# Patient Record
Sex: FEMALE | Race: WHITE
[De-identification: names, ages, dates, MRNs, and addresses within clinical notes are randomized per-mention and may not be internally consistent; named-entity substitution may affect disease eponyms.]

---

## 2017-07-11 ENCOUNTER — NON-APPOINTMENT (OUTPATIENT)
Age: 57
End: 2017-07-11

## 2017-07-11 ENCOUNTER — APPOINTMENT (OUTPATIENT)
Dept: INTERNAL MEDICINE | Facility: CLINIC | Age: 57
End: 2017-07-11

## 2017-07-11 VITALS
SYSTOLIC BLOOD PRESSURE: 102 MMHG | BODY MASS INDEX: 23.54 KG/M2 | RESPIRATION RATE: 16 BRPM | HEIGHT: 67 IN | TEMPERATURE: 98 F | HEART RATE: 74 BPM | DIASTOLIC BLOOD PRESSURE: 58 MMHG | WEIGHT: 150 LBS | OXYGEN SATURATION: 97 %

## 2017-07-11 RX ORDER — POLYETHYLENE GLYCOL 3350 17 G/17G
17 POWDER, FOR SOLUTION ORAL
Qty: 527 | Refills: 0 | Status: ACTIVE | COMMUNITY
Start: 2017-05-29

## 2017-07-11 RX ORDER — OMEPRAZOLE 40 MG/1
40 CAPSULE, DELAYED RELEASE ORAL
Qty: 30 | Refills: 0 | Status: ACTIVE | COMMUNITY
Start: 2017-03-27

## 2017-07-11 RX ORDER — LEVOTHYROXINE SODIUM 0.14 MG/1
137 TABLET ORAL
Qty: 90 | Refills: 0 | Status: ACTIVE | COMMUNITY
Start: 2017-07-01

## 2018-04-18 ENCOUNTER — INPATIENT (INPATIENT)
Facility: HOSPITAL | Age: 58
LOS: 1 days | Discharge: HOME | End: 2018-04-20
Attending: HOSPITALIST | Admitting: HOSPITALIST
Payer: COMMERCIAL

## 2018-04-18 VITALS
WEIGHT: 154.98 LBS | HEART RATE: 61 BPM | OXYGEN SATURATION: 99 % | RESPIRATION RATE: 17 BRPM | DIASTOLIC BLOOD PRESSURE: 101 MMHG | SYSTOLIC BLOOD PRESSURE: 170 MMHG | TEMPERATURE: 98 F

## 2018-04-18 LAB
ALBUMIN SERPL ELPH-MCNC: 3.8 G/DL — SIGNIFICANT CHANGE UP (ref 3.3–5)
ALP SERPL-CCNC: 68 U/L — SIGNIFICANT CHANGE UP (ref 40–120)
ALT FLD-CCNC: 20 U/L — SIGNIFICANT CHANGE UP (ref 12–78)
ANION GAP SERPL CALC-SCNC: 8 MMOL/L — SIGNIFICANT CHANGE UP (ref 5–17)
APPEARANCE UR: CLEAR — SIGNIFICANT CHANGE UP
APTT BLD: 31.6 SEC — SIGNIFICANT CHANGE UP (ref 27.5–37.4)
AST SERPL-CCNC: 12 U/L — LOW (ref 15–37)
BACTERIA # UR AUTO: (no result)
BASOPHILS # BLD AUTO: 0.03 K/UL — SIGNIFICANT CHANGE UP (ref 0–0.2)
BASOPHILS NFR BLD AUTO: 0.6 % — SIGNIFICANT CHANGE UP (ref 0–2)
BILIRUB SERPL-MCNC: 0.8 MG/DL — SIGNIFICANT CHANGE UP (ref 0.2–1.2)
BILIRUB UR-MCNC: NEGATIVE — SIGNIFICANT CHANGE UP
BLD GP AB SCN SERPL QL: SIGNIFICANT CHANGE UP
BUN SERPL-MCNC: 18 MG/DL — SIGNIFICANT CHANGE UP (ref 7–23)
CALCIUM SERPL-MCNC: 9 MG/DL — SIGNIFICANT CHANGE UP (ref 8.5–10.1)
CHLORIDE SERPL-SCNC: 103 MMOL/L — SIGNIFICANT CHANGE UP (ref 96–108)
CK SERPL-CCNC: 34 U/L — SIGNIFICANT CHANGE UP (ref 26–192)
CO2 SERPL-SCNC: 27 MMOL/L — SIGNIFICANT CHANGE UP (ref 22–31)
COLOR SPEC: YELLOW — SIGNIFICANT CHANGE UP
CREAT SERPL-MCNC: 0.68 MG/DL — SIGNIFICANT CHANGE UP (ref 0.5–1.3)
DIFF PNL FLD: NEGATIVE — SIGNIFICANT CHANGE UP
EOSINOPHIL # BLD AUTO: 0.08 K/UL — SIGNIFICANT CHANGE UP (ref 0–0.5)
EOSINOPHIL NFR BLD AUTO: 1.5 % — SIGNIFICANT CHANGE UP (ref 0–6)
EPI CELLS # UR: SIGNIFICANT CHANGE UP
GLUCOSE SERPL-MCNC: 98 MG/DL — SIGNIFICANT CHANGE UP (ref 70–99)
GLUCOSE UR QL: NEGATIVE MG/DL — SIGNIFICANT CHANGE UP
HCT VFR BLD CALC: 39.5 % — SIGNIFICANT CHANGE UP (ref 34.5–45)
HGB BLD-MCNC: 13.2 G/DL — SIGNIFICANT CHANGE UP (ref 11.5–15.5)
IMM GRANULOCYTES NFR BLD AUTO: 0.2 % — SIGNIFICANT CHANGE UP (ref 0–1.5)
INR BLD: 0.94 RATIO — SIGNIFICANT CHANGE UP (ref 0.88–1.16)
KETONES UR-MCNC: NEGATIVE — SIGNIFICANT CHANGE UP
LEUKOCYTE ESTERASE UR-ACNC: (no result)
LYMPHOCYTES # BLD AUTO: 1.53 K/UL — SIGNIFICANT CHANGE UP (ref 1–3.3)
LYMPHOCYTES # BLD AUTO: 28.2 % — SIGNIFICANT CHANGE UP (ref 13–44)
MCHC RBC-ENTMCNC: 32.2 PG — SIGNIFICANT CHANGE UP (ref 27–34)
MCHC RBC-ENTMCNC: 33.4 GM/DL — SIGNIFICANT CHANGE UP (ref 32–36)
MCV RBC AUTO: 96.3 FL — SIGNIFICANT CHANGE UP (ref 80–100)
MONOCYTES # BLD AUTO: 0.44 K/UL — SIGNIFICANT CHANGE UP (ref 0–0.9)
MONOCYTES NFR BLD AUTO: 8.1 % — SIGNIFICANT CHANGE UP (ref 2–14)
NEUTROPHILS # BLD AUTO: 3.33 K/UL — SIGNIFICANT CHANGE UP (ref 1.8–7.4)
NEUTROPHILS NFR BLD AUTO: 61.4 % — SIGNIFICANT CHANGE UP (ref 43–77)
NITRITE UR-MCNC: NEGATIVE — SIGNIFICANT CHANGE UP
NRBC # BLD: 0 /100 WBCS — SIGNIFICANT CHANGE UP (ref 0–0)
PH UR: 7 — SIGNIFICANT CHANGE UP (ref 5–8)
PLATELET # BLD AUTO: 233 K/UL — SIGNIFICANT CHANGE UP (ref 150–400)
POTASSIUM SERPL-MCNC: 4.2 MMOL/L — SIGNIFICANT CHANGE UP (ref 3.5–5.3)
POTASSIUM SERPL-SCNC: 4.2 MMOL/L — SIGNIFICANT CHANGE UP (ref 3.5–5.3)
PROT SERPL-MCNC: 7.5 GM/DL — SIGNIFICANT CHANGE UP (ref 6–8.3)
PROT UR-MCNC: NEGATIVE MG/DL — SIGNIFICANT CHANGE UP
PROTHROM AB SERPL-ACNC: 10.1 SEC — SIGNIFICANT CHANGE UP (ref 9.8–12.7)
RBC # BLD: 4.1 M/UL — SIGNIFICANT CHANGE UP (ref 3.8–5.2)
RBC # FLD: 11.9 % — SIGNIFICANT CHANGE UP (ref 10.3–14.5)
RBC CASTS # UR COMP ASSIST: NEGATIVE /HPF — SIGNIFICANT CHANGE UP (ref 0–4)
SODIUM SERPL-SCNC: 138 MMOL/L — SIGNIFICANT CHANGE UP (ref 135–145)
SP GR SPEC: 1 — LOW (ref 1.01–1.02)
TROPONIN I SERPL-MCNC: <0.015 NG/ML — SIGNIFICANT CHANGE UP (ref 0.01–0.04)
TYPE + AB SCN PNL BLD: SIGNIFICANT CHANGE UP
UROBILINOGEN FLD QL: NEGATIVE MG/DL — SIGNIFICANT CHANGE UP
WBC # BLD: 5.42 K/UL — SIGNIFICANT CHANGE UP (ref 3.8–10.5)
WBC # FLD AUTO: 5.42 K/UL — SIGNIFICANT CHANGE UP (ref 3.8–10.5)
WBC UR QL: SIGNIFICANT CHANGE UP

## 2018-04-18 PROCEDURE — 70450 CT HEAD/BRAIN W/O DYE: CPT | Mod: 26

## 2018-04-18 PROCEDURE — 93010 ELECTROCARDIOGRAM REPORT: CPT

## 2018-04-18 PROCEDURE — 71045 X-RAY EXAM CHEST 1 VIEW: CPT | Mod: 26

## 2018-04-18 PROCEDURE — 99285 EMERGENCY DEPT VISIT HI MDM: CPT

## 2018-04-18 RX ORDER — ASPIRIN/CALCIUM CARB/MAGNESIUM 324 MG
81 TABLET ORAL DAILY
Qty: 0 | Refills: 0 | Status: DISCONTINUED | OUTPATIENT
Start: 2018-04-18 | End: 2018-04-20

## 2018-04-18 RX ORDER — NEBIVOLOL HYDROCHLORIDE 5 MG/1
5 TABLET ORAL DAILY
Qty: 0 | Refills: 0 | Status: DISCONTINUED | OUTPATIENT
Start: 2018-04-18 | End: 2018-04-20

## 2018-04-18 RX ORDER — POLYETHYLENE GLYCOL 3350 17 G/17G
17 POWDER, FOR SOLUTION ORAL DAILY
Qty: 0 | Refills: 0 | Status: DISCONTINUED | OUTPATIENT
Start: 2018-04-18 | End: 2018-04-20

## 2018-04-18 RX ORDER — HEPARIN SODIUM 5000 [USP'U]/ML
5000 INJECTION INTRAVENOUS; SUBCUTANEOUS EVERY 8 HOURS
Qty: 0 | Refills: 0 | Status: DISCONTINUED | OUTPATIENT
Start: 2018-04-18 | End: 2018-04-20

## 2018-04-18 RX ORDER — LEVOTHYROXINE SODIUM 125 MCG
125 TABLET ORAL DAILY
Qty: 0 | Refills: 0 | Status: DISCONTINUED | OUTPATIENT
Start: 2018-04-18 | End: 2018-04-20

## 2018-04-18 RX ORDER — MAGNESIUM OXIDE 400 MG ORAL TABLET 241.3 MG
200 TABLET ORAL DAILY
Qty: 0 | Refills: 0 | Status: DISCONTINUED | OUTPATIENT
Start: 2018-04-18 | End: 2018-04-20

## 2018-04-18 RX ORDER — POLYETHYLENE GLYCOL 3350 17 G/17G
0 POWDER, FOR SOLUTION ORAL
Qty: 0 | Refills: 0 | COMMUNITY

## 2018-04-18 RX ORDER — CHOLECALCIFEROL (VITAMIN D3) 125 MCG
1000 CAPSULE ORAL DAILY
Qty: 0 | Refills: 0 | Status: DISCONTINUED | OUTPATIENT
Start: 2018-04-18 | End: 2018-04-20

## 2018-04-18 RX ORDER — DOCUSATE SODIUM 100 MG
100 CAPSULE ORAL THREE TIMES A DAY
Qty: 0 | Refills: 0 | Status: DISCONTINUED | OUTPATIENT
Start: 2018-04-18 | End: 2018-04-20

## 2018-04-18 RX ORDER — ASPIRIN/CALCIUM CARB/MAGNESIUM 324 MG
325 TABLET ORAL ONCE
Qty: 0 | Refills: 0 | Status: COMPLETED | OUTPATIENT
Start: 2018-04-18 | End: 2018-04-18

## 2018-04-18 RX ORDER — SENNA PLUS 8.6 MG/1
2 TABLET ORAL AT BEDTIME
Qty: 0 | Refills: 0 | Status: DISCONTINUED | OUTPATIENT
Start: 2018-04-18 | End: 2018-04-20

## 2018-04-18 RX ADMIN — HEPARIN SODIUM 5000 UNIT(S): 5000 INJECTION INTRAVENOUS; SUBCUTANEOUS at 23:53

## 2018-04-18 RX ADMIN — Medication 325 MILLIGRAM(S): at 19:17

## 2018-04-18 NOTE — H&P ADULT - ASSESSMENT
58 y/o F PMHx significant for HTN, MARIA E, and hypothyroidism presents to the ED for further evaluation of transient   right eye blindness which began while driving home @ 1530 (day of admission) associated with left arm and tongue   "numbness." The patient's symptoms lasted approximately 30 minutes and improved without intervention. The patient went to her Cardiologist's office who advised/referred to the ED t/o assess for possible TIA/CVA. Upon arrival @(1745) code stroke was initiated. Per ED NIHSS was (0) on admission. Neurology was consulted in the ED.    1)TIA  ~admit to Telemetry  ~f/u w/ Neurology in the am  ~f/u MRI Brain  ~neuro checks  ~per ED pt passed dysphasia screen  ~cont. ASA 81mg po daily (given 325mg PO x 1 in the ED)  ~f/u Lipid profile    2)HTN  ~cont. Bystolic 5mg po daily    3)Hypothyroidism  ~cont. Levothyroxine 125mcg po daily    4)Vte ppx  ~cont. Heparin sq

## 2018-04-18 NOTE — ED ADULT TRIAGE NOTE - CHIEF COMPLAINT QUOTE
pt c/op right eye momentary blindness at 1530 and then driving home she had left arm and tongue mi,bness and tingling that lasted a half hour, pt states since then the symtpoms subsided,  pt states she then went to pulmonologist who did EKG that was normal but advised pt to go to ED for r/o TIA vs/ CVA. pt states she ahs no weakness or cognitive changes, neuros itnact at this time, Dr. bojorquez at [facility] to evaluate pt - code stroke called at 1745, no hx of tia cva or mi

## 2018-04-18 NOTE — ED ADULT NURSE REASSESSMENT NOTE - NS ED NURSE REASSESS COMMENT FT1
Dr Ching made aware of elevated bp. Pt is axox4, with no deficits, no numbness, no facial drooping or slurring of speech. Awaiting ct-scan. Will continue to monitor.

## 2018-04-18 NOTE — H&P ADULT - NSHPPHYSICALEXAM_GEN_ALL_CORE
Vital Signs Last 24 Hrs  T(C): 36.7 (18 Apr 2018 19:18), Max: 36.7 (18 Apr 2018 17:40)  T(F): 98 (18 Apr 2018 19:18), Max: 98.1 (18 Apr 2018 17:40)  HR: 81 (18 Apr 2018 19:18) (61 - 82)  BP: 155/84 (18 Apr 2018 19:18) (155/84 - 174/82)  RR: 17 (18 Apr 2018 19:18) (15 - 17)  SpO2: 100% (18 Apr 2018 19:18) (99% - 100%)

## 2018-04-18 NOTE — ED PROVIDER NOTE - PROGRESS NOTE DETAILS
Jeane DOBBS: Case discussed with Dr. Ledesma on call for neurology; recommends admission to the hospital for further work up at this time; full strength aspirin given; patient in agreement in plan; will endorse to hospitalist.

## 2018-04-18 NOTE — ED ADULT NURSE NOTE - CHIEF COMPLAINT QUOTE
pt c/op right eye momentary blindness at 1530 and then driving home she had left arm and tongue mi,bness and tingling that lasted a half hour, pt states since then the symtpoms subsided,  pt states she then went to pulmonologist who did EKG that was normal but advised pt to go to ED for r/o TIA vs/ CVA. pt states she ahs no weakness or cognitive changes, neuros itnact at this time, Dr. bojorquez at [facility] to evaluate pt - code stroke called at 1745, no hx of tia cva or mi

## 2018-04-18 NOTE — ED ADULT NURSE NOTE - OBJECTIVE STATEMENT
Pt was driving and temporary became  blind in right eye and had numbness and tingling in right hand, which subsided

## 2018-04-18 NOTE — ED ADULT NURSE REASSESSMENT NOTE - NS ED NURSE REASSESS COMMENT FT1
Pt received in stretcher. Handoff given by ENDER Herrera. A&Ox3. MAEx4. No neurological deficit noted. Denies numbness or tingling at this time. Breathing spontaneously on RA. No SOB or cough. VS as documented. Medicated as ordered and tolerated well. All needs are met and safety maintained. Will continue to monitor.

## 2018-04-18 NOTE — ED PROVIDER NOTE - OBJECTIVE STATEMENT
57 y-o female presents to the ED s/p 30 minutes of numbness to left arm and left tongue, self resolved. Prior to Sx onset, Pt noted visual changes after looking into bright light. [facility]. Pt Went to Dr Garnett office [facility] and was seen by NP. Pt was told to come to ED for CT, normal EKG. No blood thinners , hx of strokes, numbness of upper extremities, slurred speech or unsteady gate.  Visual change resolved after a few seconds. Pt currently feels back to baseline. Stroke protocol called by ED attending Dr. Luis, during intake evaluation. 57 y-o female presents to the ED s/p 30 minutes of numbness to left arm and left tongue, self resolved. Prior to Sx onset, Pt noted visual changes after looking into bright light. Symptoms onset [facility] issue. Pt Went to Dr Garnett office [facility] and was seen by NP. Pt was told to come to ED for CT, normal EKG at [facility] office. No blood thinners , hx of strokes, numbness of upper extremities, slurred speech or unsteady gate.  Visual change resolved after a few seconds. Pt currently feels back to baseline. Stroke protocol called by ED attending Dr. Luis, during intake evaluation.

## 2018-04-18 NOTE — ED PROVIDER NOTE - MEDICAL DECISION MAKING DETAILS
57 y-o presents with left sided parosteitis, no focal neuro deficits. Will give Code Stroke workup. Ct head, EKG, CXR, Labs, UA, consult Neuro. 57 y-o presents with ?TIA, no focal neuro deficits at this time. Code stroke activated; EKG/CXR, labs, CT scan, UA; neuro consult; re-eval

## 2018-04-18 NOTE — H&P ADULT - HISTORY OF PRESENT ILLNESS
58 y/o F PMHx significant for HTN, MARIA E, and hypothyroidism presents to the ED for further evaluation of transient   right eye blindness which began while driving home @ 1530 (day of admission) associated with left arm and tongue   "numbness." The patient's symptoms lasted approximately 30 minutes and improved without intervention. The patient went to her Cardiologist's office who advised/referred to the ED t/o assess for possible TIA/CVA. Upon arrival @(6605) code stroke was initiated. Per ED NIHSS was (0) on admission. Neurology was consulted in the ED.

## 2018-04-18 NOTE — ED STATDOCS - PROGRESS NOTE DETAILS
Yajaira LEDEZMA for ED attending, Dr. Luis: 58 Y/O  female ambulatory to ED s/p 30 minutes of numbness left arm and left tongue, self resolved. Before that noted visual change after looking into bright light. Visual change resolved after a few seconds. Pt currently feels back to baseline, will institute stroke protocol, CT head, labs, send to [facility] for further evaluation. Will send pt to the [facility] ED for further evaluation. Yajaira LEDEZMA for ED attending, Dr. Luis: 56 Y/O  female ambulatory to ED s/p 30 minutes of numbness left arm and left tongue, self resolved. Before that noted visual change after looking into bright light. Symptoms onset ~ 3:30 PM.  Visual change resolved after a few seconds. Pt currently feels back to baseline, will institute stroke protocol, CT head, labs, send to [facility] for further evaluation. Will send pt to the [facility] ED for further evaluation.

## 2018-04-19 ENCOUNTER — TRANSCRIPTION ENCOUNTER (OUTPATIENT)
Age: 58
End: 2018-04-19

## 2018-04-19 LAB
ALBUMIN SERPL ELPH-MCNC: 3.3 G/DL — SIGNIFICANT CHANGE UP (ref 3.3–5)
ALP SERPL-CCNC: 62 U/L — SIGNIFICANT CHANGE UP (ref 40–120)
ALT FLD-CCNC: 17 U/L — SIGNIFICANT CHANGE UP (ref 12–78)
ANION GAP SERPL CALC-SCNC: 8 MMOL/L — SIGNIFICANT CHANGE UP (ref 5–17)
AST SERPL-CCNC: 11 U/L — LOW (ref 15–37)
BASOPHILS # BLD AUTO: 0.02 K/UL — SIGNIFICANT CHANGE UP (ref 0–0.2)
BASOPHILS NFR BLD AUTO: 0.4 % — SIGNIFICANT CHANGE UP (ref 0–2)
BILIRUB SERPL-MCNC: 1.3 MG/DL — HIGH (ref 0.2–1.2)
BUN SERPL-MCNC: 22 MG/DL — SIGNIFICANT CHANGE UP (ref 7–23)
CALCIUM SERPL-MCNC: 8.7 MG/DL — SIGNIFICANT CHANGE UP (ref 8.5–10.1)
CHLORIDE SERPL-SCNC: 106 MMOL/L — SIGNIFICANT CHANGE UP (ref 96–108)
CHOLEST SERPL-MCNC: 186 MG/DL — SIGNIFICANT CHANGE UP (ref 10–199)
CO2 SERPL-SCNC: 26 MMOL/L — SIGNIFICANT CHANGE UP (ref 22–31)
CREAT SERPL-MCNC: 0.61 MG/DL — SIGNIFICANT CHANGE UP (ref 0.5–1.3)
CULTURE RESULTS: SIGNIFICANT CHANGE UP
EOSINOPHIL # BLD AUTO: 0.11 K/UL — SIGNIFICANT CHANGE UP (ref 0–0.5)
EOSINOPHIL NFR BLD AUTO: 2.3 % — SIGNIFICANT CHANGE UP (ref 0–6)
ESTIMATED AVERAGE GLUCOSE: 105 MG/DL — SIGNIFICANT CHANGE UP (ref 68–114)
GLUCOSE SERPL-MCNC: 89 MG/DL — SIGNIFICANT CHANGE UP (ref 70–99)
HBA1C BLD-MCNC: 5.3 % — SIGNIFICANT CHANGE UP (ref 4–5.6)
HBA1C BLD-MCNC: 5.3 % — SIGNIFICANT CHANGE UP (ref 4–5.6)
HCT VFR BLD CALC: 39.3 % — SIGNIFICANT CHANGE UP (ref 34.5–45)
HDLC SERPL-MCNC: 74 MG/DL — SIGNIFICANT CHANGE UP (ref 40–125)
HGB BLD-MCNC: 12.9 G/DL — SIGNIFICANT CHANGE UP (ref 11.5–15.5)
IMM GRANULOCYTES NFR BLD AUTO: 0.4 % — SIGNIFICANT CHANGE UP (ref 0–1.5)
LIPID PNL WITH DIRECT LDL SERPL: 105 MG/DL — SIGNIFICANT CHANGE UP
LYMPHOCYTES # BLD AUTO: 1.29 K/UL — SIGNIFICANT CHANGE UP (ref 1–3.3)
LYMPHOCYTES # BLD AUTO: 27.3 % — SIGNIFICANT CHANGE UP (ref 13–44)
MCHC RBC-ENTMCNC: 31.7 PG — SIGNIFICANT CHANGE UP (ref 27–34)
MCHC RBC-ENTMCNC: 32.8 GM/DL — SIGNIFICANT CHANGE UP (ref 32–36)
MCV RBC AUTO: 96.6 FL — SIGNIFICANT CHANGE UP (ref 80–100)
MONOCYTES # BLD AUTO: 0.39 K/UL — SIGNIFICANT CHANGE UP (ref 0–0.9)
MONOCYTES NFR BLD AUTO: 8.3 % — SIGNIFICANT CHANGE UP (ref 2–14)
NEUTROPHILS # BLD AUTO: 2.89 K/UL — SIGNIFICANT CHANGE UP (ref 1.8–7.4)
NEUTROPHILS NFR BLD AUTO: 61.3 % — SIGNIFICANT CHANGE UP (ref 43–77)
NRBC # BLD: 0 /100 WBCS — SIGNIFICANT CHANGE UP (ref 0–0)
PLATELET # BLD AUTO: 219 K/UL — SIGNIFICANT CHANGE UP (ref 150–400)
POTASSIUM SERPL-MCNC: 3.9 MMOL/L — SIGNIFICANT CHANGE UP (ref 3.5–5.3)
POTASSIUM SERPL-SCNC: 3.9 MMOL/L — SIGNIFICANT CHANGE UP (ref 3.5–5.3)
PROT SERPL-MCNC: 6.7 GM/DL — SIGNIFICANT CHANGE UP (ref 6–8.3)
RBC # BLD: 4.07 M/UL — SIGNIFICANT CHANGE UP (ref 3.8–5.2)
RBC # FLD: 12 % — SIGNIFICANT CHANGE UP (ref 10.3–14.5)
SODIUM SERPL-SCNC: 140 MMOL/L — SIGNIFICANT CHANGE UP (ref 135–145)
SPECIMEN SOURCE: SIGNIFICANT CHANGE UP
TOTAL CHOLESTEROL/HDL RATIO MEASUREMENT: 2.5 RATIO — LOW (ref 3.3–7.1)
TRIGL SERPL-MCNC: 37 MG/DL — SIGNIFICANT CHANGE UP (ref 10–149)
WBC # BLD: 4.72 K/UL — SIGNIFICANT CHANGE UP (ref 3.8–10.5)
WBC # FLD AUTO: 4.72 K/UL — SIGNIFICANT CHANGE UP (ref 3.8–10.5)

## 2018-04-19 PROCEDURE — 99223 1ST HOSP IP/OBS HIGH 75: CPT

## 2018-04-19 PROCEDURE — 70551 MRI BRAIN STEM W/O DYE: CPT | Mod: 26

## 2018-04-19 PROCEDURE — 70547 MR ANGIOGRAPHY NECK W/O DYE: CPT | Mod: 26

## 2018-04-19 PROCEDURE — 93306 TTE W/DOPPLER COMPLETE: CPT | Mod: 26

## 2018-04-19 PROCEDURE — 70544 MR ANGIOGRAPHY HEAD W/O DYE: CPT | Mod: 26,59

## 2018-04-19 RX ORDER — ATORVASTATIN CALCIUM 80 MG/1
40 TABLET, FILM COATED ORAL AT BEDTIME
Qty: 0 | Refills: 0 | Status: DISCONTINUED | OUTPATIENT
Start: 2018-04-19 | End: 2018-04-20

## 2018-04-19 RX ADMIN — MAGNESIUM OXIDE 400 MG ORAL TABLET 200 MILLIGRAM(S): 241.3 TABLET ORAL at 10:29

## 2018-04-19 RX ADMIN — Medication 1000 UNIT(S): at 10:31

## 2018-04-19 RX ADMIN — HEPARIN SODIUM 5000 UNIT(S): 5000 INJECTION INTRAVENOUS; SUBCUTANEOUS at 04:40

## 2018-04-19 RX ADMIN — Medication 81 MILLIGRAM(S): at 10:31

## 2018-04-19 RX ADMIN — Medication 125 MICROGRAM(S): at 04:40

## 2018-04-19 RX ADMIN — NEBIVOLOL HYDROCHLORIDE 5 MILLIGRAM(S): 5 TABLET ORAL at 04:40

## 2018-04-19 RX ADMIN — ATORVASTATIN CALCIUM 40 MILLIGRAM(S): 80 TABLET, FILM COATED ORAL at 22:16

## 2018-04-19 RX ADMIN — POLYETHYLENE GLYCOL 3350 17 GRAM(S): 17 POWDER, FOR SOLUTION ORAL at 10:32

## 2018-04-19 NOTE — DISCHARGE NOTE ADULT - PLAN OF CARE
avoid stroke Continue aspirin and lipitor 40 mg upon discharge.  Follow up with Dr. Duarte within one week of discharge. avoid heart attack and stroke Continue Bystolic 5 mg as directed prior to admission. maintain adequate thyroid function Continue taking Synthroid as directed prior to admission.

## 2018-04-19 NOTE — DISCHARGE NOTE ADULT - MEDICATION SUMMARY - MEDICATIONS TO TAKE
I will START or STAY ON the medications listed below when I get home from the hospital:    aspirin 81 mg oral delayed release tablet  -- 1 tab(s) by mouth once a day  -- Indication: For TIA (transient ischemic attack)    atorvastatin 40 mg oral tablet  -- 1 tab(s) by mouth once a day (at bedtime)  -- Indication: For TIA (transient ischemic attack)    Bystolic 5 mg oral tablet  -- 1 tab(s) by mouth once a day  -- Indication: For Essential hypertension    MiraLax oral powder for reconstitution  -- 17 gram(s) by mouth once a day, As Needed  -- Indication: For constipation    magnesium oxide 200 mg oral tablet  -- 1 tab(s) by mouth once a day  -- Indication: For prophylactic measure    Synthroid 125 mcg (0.125 mg) oral tablet  -- 1 tab(s) by mouth once a day  -- Indication: For Hypothyroidism    cholecalciferol 1000 intl units oral tablet  -- 1 tab(s) by mouth once a day  -- Indication: For prophylactic measure

## 2018-04-19 NOTE — PROGRESS NOTE ADULT - SUBJECTIVE AND OBJECTIVE BOX
Pt has been seen and examined with FP resident, resident supervised agree with a/p       Patient is a 57y old  Female who presents with a chief complaint of Transient vision changes with left arm/leg weakness. (18 Apr 2018 20:09)        HPI:  58 y/o F PMHx significant for HTN, MARIA E, and hypothyroidism presents to the ED for further evaluation of transient   right eye blindness which began while driving home @ 1530 (day of admission) associated with left arm and tongue   "numbness.    PHYSICAL EXAM:  Vital Signs Last 24 Hrs  T(C): 36.9 (19 Apr 2018 16:53), Max: 36.9 (19 Apr 2018 16:53)  T(F): 98.4 (19 Apr 2018 16:53), Max: 98.4 (19 Apr 2018 16:53)  HR: 68 (19 Apr 2018 16:53) (58 - 82)  BP: 95/57 (19 Apr 2018 16:53) (95/57 - 174/82)  BP(mean): --  RR: 18 (19 Apr 2018 16:53) (15 - 18)  SpO2: 98% (19 Apr 2018 16:53) (97% - 100%)  general- comfortable   -rs-aeeb,cta  -cvs-s1s2 normal   -p/a-soft,bs+  -extremity- no asymmetrical swelling noted           A/P    #work up to follow and possible d/c if work up is complete

## 2018-04-19 NOTE — CONSULT NOTE ADULT - ASSESSMENT
58 y/o F PMHx significant for HTN, MARIA E, and hypothyroidism presents to the ED for further evaluation of transient right eye blindness which began while driving home @ 1530 (day of admission) associated with left arm and tongue "numbness with resolved Sx with non focal exam now.     R/ o TIA  ~admit to Telemetry  ~f/u MRI Brain/MRA  ~neuro checks  - ASA/ Statin  - If Neg F/u in office after D/c.  - Discussed with Pt and DR. Duarte.

## 2018-04-19 NOTE — CONSULT NOTE ADULT - SUBJECTIVE AND OBJECTIVE BOX
Patient is a 57y old  Female who presents with a chief complaint of Transient vision changes with left arm/leg weakness while driving yesterday      HPI:  58 y/o F PMHx significant for HTN, MARIA E, and hypothyroidism presents to the ED for further evaluation of transient right eye blindness which began while driving home @ 1530 (day of admission) associated with left arm and tongue "numbness." The patient's symptoms lasted approximately 30 minutes and improved without intervention. The patient went to her Cardiologist's office who advised/referred to the ED t/o assess for possible TIA/CVA. Upon arrival @(1745) code stroke was initiated. Per ED NIHSS was (0) on admission. No prior similar sx.      PAST MEDICAL & SURGICAL HISTORY:  Hypothyroidism  Essential hypertension  No significant past surgical history      FAMILY HISTORY:  No pertinent family history in first degree relatives      Social Hx:  Nonsmoker, no drug or alcohol use    MEDICATIONS  (STANDING):  aspirin enteric coated 81 milliGRAM(s) Oral daily  atorvastatin 40 milliGRAM(s) Oral at bedtime  cholecalciferol 1000 Unit(s) Oral daily  heparin  Injectable 5000 Unit(s) SubCutaneous every 8 hours  levothyroxine 125 MICROGram(s) Oral daily  magnesium oxide 200 milliGRAM(s) Oral daily  nebivolol 5 milliGRAM(s) Oral daily       Allergies    No Known Allergies    ROS: Pertinent positives in HPI, all other ROS were reviewed and are negative.      Vital Signs Last 24 Hrs  T(C): 36.8 (19 Apr 2018 04:44), Max: 36.8 (19 Apr 2018 04:44)  T(F): 98.2 (19 Apr 2018 04:44), Max: 98.2 (19 Apr 2018 04:44)  HR: 62 (19 Apr 2018 04:44) (61 - 82)  BP: 114/57 (19 Apr 2018 04:44) (114/57 - 174/82)  BP(mean): --  RR: 18 (19 Apr 2018 04:44) (15 - 18)  SpO2: 98% (19 Apr 2018 04:44) (98% - 100%)        Constitutional: awake and alert.  HEENT: PERRLA, EOMI,   Neck: Supple.  Respiratory: Breath sounds are clear bilaterally  Cardiovascular: S1 and S2, regular rhythm  Gastrointestinal: soft, nontender  Extremities:  no edema  Vascular:  Carotid Bruit - no  Musculoskeletal: no joint swelling/tenderness, no abnormal movements  Skin: No rashes    Neurological exam:  HF: A x O x 3. Appropriately interactive, normal affect. Speech fluent, No Aphasia or paraphasic errors. Naming /repetition intact   CN: ADY, EOMI, VFF, facial sensation normal, no NLFD, tongue midline, Palate moves equally, SCM equal bilaterally  Motor: No pronator drift, Strength 5/5 in all 4 ext, normal bulk and tone, no tremor, rigidity or bradykinesia.    Sens: Intact to light touch     Reflexes: Symmetric and normal . BJ 2+, BR 2+, KJ 2+, AJ 2+, downgoing toes b/l  Coord:  No FNFA, dysmetria, NISHA intact   Gait/Balance: Cannot test    NIHSS: 0          Labs:   04-19    140  |  106  |  22  ----------------------------<  89  3.9   |  26  |  0.61    Ca    8.7      19 Apr 2018 06:01    TPro  6.7  /  Alb  3.3  /  TBili  1.3<H>  /  DBili  x   /  AST  11<L>  /  ALT  17  /  AlkPhos  62  04-19                              12.9   4.72  )-----------( 219      ( 19 Apr 2018 06:01 )             39.3       Radiology:  - CT Head: 4/18/18  < from: CT Brain Stroke Protocol (04.18.18 @ 18:06) >  IMPRESSION:   Unremarkable head CT.

## 2018-04-19 NOTE — DISCHARGE NOTE ADULT - CARE PLAN
Principal Discharge DX:	TIA (transient ischemic attack)  Goal:	avoid stroke  Assessment and plan of treatment:	Continue aspirin and lipitor 40 mg upon discharge.  Follow up with Dr. Duarte within one week of discharge.  Secondary Diagnosis:	Essential hypertension  Goal:	avoid heart attack and stroke  Assessment and plan of treatment:	Continue Bystolic 5 mg as directed prior to admission.  Secondary Diagnosis:	Hypothyroidism  Goal:	maintain adequate thyroid function  Assessment and plan of treatment:	Continue taking Synthroid as directed prior to admission.

## 2018-04-19 NOTE — DISCHARGE NOTE ADULT - CARE PROVIDER_API CALL
Marcus Duarte (MD), Cardiovascular Disease; Internal Medicine  [address] [place] [address]  Phone: [phone]  Fax: [phone]

## 2018-04-19 NOTE — DISCHARGE NOTE ADULT - NS AS ACTIVITY OBS
Sex allowed/No Heavy lifting/straining/Return to Work/School allowed/Driving allowed/Walking-Indoors allowed/Walking-Outdoors allowed/Stairs allowed

## 2018-04-19 NOTE — DISCHARGE NOTE ADULT - PATIENT PORTAL LINK FT
You can access the [facility], offered by [facility], by registering with the following website: [email or web address]

## 2018-04-19 NOTE — DISCHARGE NOTE ADULT - HOSPITAL COURSE
56 yo F admitted with transient monocular vision loss likely secondary to amaurosis fugax. Ischemic and intracranial bleed workup including CT head and MRI/MRA were both negative. Echocardiogram was performed to rule out a clot. Patient is now asymptomatic and stable for discharge.

## 2018-04-20 VITALS
TEMPERATURE: 99 F | OXYGEN SATURATION: 97 % | RESPIRATION RATE: 17 BRPM | DIASTOLIC BLOOD PRESSURE: 61 MMHG | HEART RATE: 67 BPM | SYSTOLIC BLOOD PRESSURE: 110 MMHG

## 2018-04-20 DIAGNOSIS — I10 ESSENTIAL (PRIMARY) HYPERTENSION: ICD-10-CM

## 2018-04-20 DIAGNOSIS — G45.9 TRANSIENT CEREBRAL ISCHEMIC ATTACK, UNSPECIFIED: ICD-10-CM

## 2018-04-20 DIAGNOSIS — E03.9 HYPOTHYROIDISM, UNSPECIFIED: ICD-10-CM

## 2018-04-20 PROCEDURE — 99233 SBSQ HOSP IP/OBS HIGH 50: CPT

## 2018-04-20 RX ORDER — ASPIRIN/CALCIUM CARB/MAGNESIUM 324 MG
1 TABLET ORAL
Qty: 15 | Refills: 0 | OUTPATIENT
Start: 2018-04-20 | End: 2018-05-04

## 2018-04-20 RX ORDER — ATORVASTATIN CALCIUM 80 MG/1
1 TABLET, FILM COATED ORAL
Qty: 15 | Refills: 0 | OUTPATIENT
Start: 2018-04-20 | End: 2018-05-04

## 2018-04-20 RX ADMIN — POLYETHYLENE GLYCOL 3350 17 GRAM(S): 17 POWDER, FOR SOLUTION ORAL at 07:44

## 2018-04-20 RX ADMIN — NEBIVOLOL HYDROCHLORIDE 5 MILLIGRAM(S): 5 TABLET ORAL at 05:16

## 2018-04-20 RX ADMIN — MAGNESIUM OXIDE 400 MG ORAL TABLET 200 MILLIGRAM(S): 241.3 TABLET ORAL at 11:49

## 2018-04-20 RX ADMIN — Medication 1000 UNIT(S): at 11:49

## 2018-04-20 RX ADMIN — Medication 81 MILLIGRAM(S): at 12:27

## 2018-04-20 RX ADMIN — Medication 125 MICROGRAM(S): at 05:16

## 2018-04-20 NOTE — PROGRESS NOTE ADULT - ASSESSMENT
Assessment and Recommendation:   · Assessment		  56 y/o F PMHx significant for HTN, MARIA E, and hypothyroidism presents to the ED for further evaluation of transient right eye blindness which began while driving home @ 1530 (day of admission) associated with left arm and tongue "numbness with resolved Sx with non focal exam now.     R/ o TIA  ~admit to Telemetry  ~ MRI Brain/MRA reported neg  - ASA/ Statin  - If Neg F/u in office after D/c.  - Discussed with Pt and DR. Duarte.

## 2018-04-20 NOTE — SWALLOW BEDSIDE ASSESSMENT ADULT - COMMENTS
The patient was admitted to  with blurred vision on the right, LUE/LLE weakness and tongue numbness that improved. Unclear if pt is s/p a TIA. This profile is superimposed upon a prior history of MARIA E, HTN and hypothyroidism.

## 2018-04-20 NOTE — SWALLOW BEDSIDE ASSESSMENT ADULT - SLP GENERAL OBSERVATIONS
Pt is alert, interactive, oriented x3+ and able to verbalize during communicative probes/in conversation. Her utterances were intelligible, fluent, linguistically intact and contextually appropriate. No dysarthria, verbal apraxia or aphasia were evident on exam. At reported communicative baseline.  She denied Dysphagia when asked.

## 2018-04-20 NOTE — PROGRESS NOTE ADULT - SUBJECTIVE AND OBJECTIVE BOX
CHIEF COMPLAINT:    SUBJECTIVE:     REVIEW OF SYSTEMS:    CONSTITUTIONAL: No weakness, fevers or chills  EYES/ENT: No visual changes;  No vertigo or throat pain   NECK: No pain or stiffness  RESPIRATORY: No cough, wheezing, hemoptysis; No shortness of breath  CARDIOVASCULAR: No chest pain or palpitations  GASTROINTESTINAL: No abdominal or epigastric pain. No nausea, vomiting, or hematemesis; No diarrhea or constipation. No melena or hematochezia.  GENITOURINARY: No dysuria, frequency or hematuria  NEUROLOGICAL: No numbness or weakness  SKIN: No itching, burning, rashes, or lesions   All other review of systems is negative unless indicated above    Vital Signs Last 24 Hrs  T(C): 36.3 (20 Apr 2018 04:33), Max: 36.9 (19 Apr 2018 16:53)  T(F): 97.3 (20 Apr 2018 04:33), Max: 98.4 (19 Apr 2018 16:53)  HR: 62 (20 Apr 2018 04:33) (58 - 68)  BP: 101/88 (20 Apr 2018 04:33) (95/57 - 126/68)  BP(mean): --  RR: 18 (19 Apr 2018 21:55) (17 - 18)  SpO2: 99% (20 Apr 2018 04:33) (97% - 99%)    I&O's Summary      CAPILLARY BLOOD GLUCOSE          PHYSICAL EXAM:    Constitutional: NAD, awake and alert, well-developed  HEENT: PERR, EOMI, Normal Hearing, MMM  Neck: Soft and supple, No LAD, No JVD  Respiratory: Breath sounds are clear bilaterally, No wheezing, rales or rhonchi  Cardiovascular: S1 and S2, regular rate and rhythm, no Murmurs, gallops or rubs  Gastrointestinal: Bowel Sounds present, soft, nontender, nondistended, no guarding, no rebound  Extremities: No peripheral edema  Vascular: 2+ peripheral pulses  Neurological: A/O x 3, no focal deficits  Musculoskeletal: 5/5 strength b/l upper and lower extremities  Skin: No rashes    MEDICATIONS:  MEDICATIONS  (STANDING):  aspirin enteric coated 81 milliGRAM(s) Oral daily  atorvastatin 40 milliGRAM(s) Oral at bedtime  cholecalciferol 1000 Unit(s) Oral daily  heparin  Injectable 5000 Unit(s) SubCutaneous every 8 hours  levothyroxine 125 MICROGram(s) Oral daily  magnesium oxide 200 milliGRAM(s) Oral daily  nebivolol 5 milliGRAM(s) Oral daily      LABS: All Labs Reviewed:                        12.9   4.72  )-----------( 219      ( 19 Apr 2018 06:01 )             39.3     04-19    140  |  106  |  22  ----------------------------<  89  3.9   |  26  |  0.61    Ca    8.7      19 Apr 2018 06:01    TPro  6.7  /  Alb  3.3  /  TBili  1.3<H>  /  DBili  x   /  AST  11<L>  /  ALT  17  /  AlkPhos  62  04-19    PT/INR - ( 18 Apr 2018 18:11 )   PT: 10.1 sec;   INR: 0.94 ratio         PTT - ( 18 Apr 2018 18:11 )  PTT:31.6 sec  CARDIAC MARKERS ( 18 Apr 2018 18:11 )  <0.015 ng/mL / x     / 34 U/L / x     / x          Blood Culture: 04-18 @ 18:57  Organism --  Gram Stain Blood -- Gram Stain --  Specimen Source .Urine None  Culture-Blood --        RADIOLOGY/EKG:    DVT PPX:    ADVANCED DIRECTIVE:    DISPOSITION: CHIEF COMPLAINT: right eye visual loss    HPI: 56 y/o F PMHx significant for HTN, MARIA E, and hypothyroidism presents to the ED for further evaluation of transient   right eye blindness which began while driving home @ 1530 (day of admission) associated with left arm and tongue   "numbness." The patient's symptoms lasted approximately 30 minutes and improved without intervention. The patient went to her Cardiologist's office who advised/referred to the ED t/o assess for possible TIA/CVA. Upon arrival @(1745) code stroke was initiated. Per ED NIHSS was (0) on admission. Neurology was consulted in the ED.     SUBJECTIVE: Patient seen and examined this AM. Feeling well and stable for discharge. No active complaints or acute events overnight.    REVIEW OF SYSTEMS:    CONSTITUTIONAL: No weakness, fevers or chills  EYES/ENT: No visual changes;  No vertigo or throat pain   NECK: No pain or stiffness  RESPIRATORY: No cough, wheezing, hemoptysis; No shortness of breath  CARDIOVASCULAR: No chest pain or palpitations  GASTROINTESTINAL: No abdominal or epigastric pain. No nausea, vomiting, or hematemesis; No diarrhea or constipation. No melena or hematochezia.  GENITOURINARY: No dysuria, frequency or hematuria  NEUROLOGICAL: No numbness or weakness  SKIN: No itching, burning, rashes, or lesions   All other review of systems is negative unless indicated above    Vital Signs Last 24 Hrs  T(C): 36.3 (20 Apr 2018 04:33), Max: 36.9 (19 Apr 2018 16:53)  T(F): 97.3 (20 Apr 2018 04:33), Max: 98.4 (19 Apr 2018 16:53)  HR: 62 (20 Apr 2018 04:33) (58 - 68)  BP: 101/88 (20 Apr 2018 04:33) (95/57 - 126/68)  BP(mean): --  RR: 18 (19 Apr 2018 21:55) (17 - 18)  SpO2: 99% (20 Apr 2018 04:33) (97% - 99%)    I&O's Summary      CAPILLARY BLOOD GLUCOSE          PHYSICAL EXAM:    Constitutional: NAD, awake and alert, well-developed  HEENT: PERR, EOMI, Normal Hearing, MMM  Neck: Soft and supple, No LAD, No JVD  Respiratory: Breath sounds are clear bilaterally, No wheezing, rales or rhonchi  Cardiovascular: S1 and S2, regular rate and rhythm, no Murmurs, gallops or rubs  Gastrointestinal: Bowel Sounds present, soft, nontender, nondistended, no guarding, no rebound  Extremities: No peripheral edema  Vascular: 2+ peripheral pulses  Neurological: A/O x 3, no focal deficits  Musculoskeletal: 5/5 strength b/l upper and lower extremities  Skin: No rashes    MEDICATIONS:  MEDICATIONS  (STANDING):  aspirin enteric coated 81 milliGRAM(s) Oral daily  atorvastatin 40 milliGRAM(s) Oral at bedtime  cholecalciferol 1000 Unit(s) Oral daily  heparin  Injectable 5000 Unit(s) SubCutaneous every 8 hours  levothyroxine 125 MICROGram(s) Oral daily  magnesium oxide 200 milliGRAM(s) Oral daily  nebivolol 5 milliGRAM(s) Oral daily      LABS: All Labs Reviewed:                        12.9   4.72  )-----------( 219      ( 19 Apr 2018 06:01 )             39.3     04-19    140  |  106  |  22  ----------------------------<  89  3.9   |  26  |  0.61    Ca    8.7      19 Apr 2018 06:01    TPro  6.7  /  Alb  3.3  /  TBili  1.3<H>  /  DBili  x   /  AST  11<L>  /  ALT  17  /  AlkPhos  62  04-19    PT/INR - ( 18 Apr 2018 18:11 )   PT: 10.1 sec;   INR: 0.94 ratio         PTT - ( 18 Apr 2018 18:11 )  PTT:31.6 sec  CARDIAC MARKERS ( 18 Apr 2018 18:11 )  <0.015 ng/mL / x     / 34 U/L / x     / x          Blood Culture: 04-18 @ 18:57  Organism --  Gram Stain Blood -- Gram Stain --  Specimen Source .Urine None  Culture-Blood --        RADIOLOGY/EKG:    DVT PPX:    ADVANCED DIRECTIVE:    DISPOSITION:

## 2018-04-20 NOTE — SWALLOW BEDSIDE ASSESSMENT ADULT - SWALLOW EVAL: RECOMMENDED DIET
SUGGEST A REGULAR TEXTURE DIET WITH THIN LIQUID CONSISTENCIES AS SHE TOLERATED SAME FROM AN OROPHARYNGEAL SWALLOWING STANCE ON CLINICAL EXAM.

## 2018-04-20 NOTE — SWALLOW BEDSIDE ASSESSMENT ADULT - SWALLOW EVAL: CRITERIA FOR SKILLED INTERVENTION MET
AT COMMUNICATIVE AND OROPHARYNGEAL SWALLOW BASELINE.  NO NEED FOR SPEECH./SWALLOW THERAPY. THUS, WILL NOT ACTIVELY FOLLOW. RECONSULT PRN./no problems identified which require skilled intervention
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
## 2018-04-20 NOTE — PROGRESS NOTE ADULT - SUBJECTIVE AND OBJECTIVE BOX
Pt has been seen and examined with FP resident, resident supervised agree with a/p       Patient is a 57y old  Female who presents with a chief complaint of Transient vision changes with left arm/leg weakness. (19 Apr 2018 17:15)          PHYSICAL EXAM:  Vital Signs Last 24 Hrs  T(C): 37.1 (20 Apr 2018 10:32), Max: 37.1 (20 Apr 2018 10:32)  T(F): 98.8 (20 Apr 2018 10:32), Max: 98.8 (20 Apr 2018 10:32)  HR: 67 (20 Apr 2018 10:32) (62 - 68)  BP: 110/61 (20 Apr 2018 10:32) (95/57 - 126/68)  BP(mean): --  RR: 17 (20 Apr 2018 10:32) (17 - 18)  SpO2: 97% (20 Apr 2018 10:32) (97% - 99%)  general- comfortable   -rs-aeeb,cta  -cvs-s1s2 normal   -p/a-soft,bs+  -extremity- no asymmetrical swelling noted   -cns- non focal         A/P    #d/c today, time spent 65 minutes

## 2018-04-20 NOTE — SWALLOW BEDSIDE ASSESSMENT ADULT - SWALLOW EVAL: DIAGNOSIS
1) The patient demonstrates Oropharyngeal Swallowing integrity which subjectively appears to be stable and within functional parameters. No behavioral aspiration signs exhibited on exam.   2) Pt is alert, interactive, oriented x3+ and able to verbalize during communicative probes/in conversation. Her utterances were intelligible, fluent, linguistically intact and contextually appropriate. No dysarthria, verbal apraxia or aphasia were evident on exam. At reported communicative baseline.

## 2018-04-23 DIAGNOSIS — E03.9 HYPOTHYROIDISM, UNSPECIFIED: ICD-10-CM

## 2018-04-23 DIAGNOSIS — G45.3 AMAUROSIS FUGAX: ICD-10-CM

## 2018-04-23 DIAGNOSIS — G45.9 TRANSIENT CEREBRAL ISCHEMIC ATTACK, UNSPECIFIED: ICD-10-CM

## 2018-04-23 DIAGNOSIS — R29.898 OTHER SYMPTOMS AND SIGNS INVOLVING THE MUSCULOSKELETAL SYSTEM: ICD-10-CM

## 2018-04-23 DIAGNOSIS — I10 ESSENTIAL (PRIMARY) HYPERTENSION: ICD-10-CM

## 2018-04-23 DIAGNOSIS — G47.33 OBSTRUCTIVE SLEEP APNEA (ADULT) (PEDIATRIC): ICD-10-CM

## 2018-04-23 DIAGNOSIS — R20.0 ANESTHESIA OF SKIN: ICD-10-CM

## 2018-05-15 ENCOUNTER — NON-APPOINTMENT (OUTPATIENT)
Age: 58
End: 2018-05-15

## 2018-05-15 ENCOUNTER — APPOINTMENT (OUTPATIENT)
Dept: INTERNAL MEDICINE | Facility: CLINIC | Age: 58
End: 2018-05-15
Payer: COMMERCIAL

## 2018-05-15 VITALS
WEIGHT: 163 LBS | OXYGEN SATURATION: 98 % | DIASTOLIC BLOOD PRESSURE: 64 MMHG | RESPIRATION RATE: 16 BRPM | TEMPERATURE: 98.3 F | HEIGHT: 67 IN | HEART RATE: 68 BPM | BODY MASS INDEX: 25.58 KG/M2 | SYSTOLIC BLOOD PRESSURE: 112 MMHG

## 2018-05-15 PROBLEM — E03.9 HYPOTHYROIDISM, UNSPECIFIED: Chronic | Status: ACTIVE | Noted: 2018-04-18

## 2018-05-15 PROBLEM — I10 ESSENTIAL (PRIMARY) HYPERTENSION: Chronic | Status: ACTIVE | Noted: 2018-04-18

## 2018-05-15 PROCEDURE — 99213 OFFICE O/P EST LOW 20 MIN: CPT | Mod: 25

## 2018-05-15 PROCEDURE — 94060 EVALUATION OF WHEEZING: CPT

## 2018-05-15 NOTE — REVIEW OF SYSTEMS
[Fever] : fever [Chills] : chills [Fatigue] : fatigue [Night Sweats] : no night sweats [Nasal Discharge] : nasal discharge [Sore Throat] : sore throat [Wheezing] : no wheezing [Cough] : cough [Dyspnea on Exertion] : dyspnea on exertion [Joint Pain] : no joint pain [Joint Swelling] : no joint swelling [Muscle Weakness] : no muscle weakness [Back Pain] : back pain [Negative] : Heme/Lymph

## 2018-05-15 NOTE — PHYSICAL EXAM
[No Acute Distress] : no acute distress [Well Nourished] : well nourished [Well Developed] : well developed [Normal Sclera/Conjunctiva] : normal sclera/conjunctiva [PERRL] : pupils equal round and reactive to light [Normal TMs] : both tympanic membranes were normal [Supple] : supple [No Respiratory Distress] : no respiratory distress  [Clear to Auscultation] : lungs were clear to auscultation bilaterally [Normal Rate] : normal rate  [Regular Rhythm] : with a regular rhythm [No Edema] : there was no peripheral edema [Normal Posterior Cervical Nodes] : no posterior cervical lymphadenopathy [Normal Anterior Cervical Nodes] : no anterior cervical lymphadenopathy [No Spinal Tenderness] : no spinal tenderness [Grossly Normal Strength/Tone] : grossly normal strength/tone [No Rash] : no rash [No Focal Deficits] : no focal deficits [Normal Affect] : the affect was normal [Normal Insight/Judgement] : insight and judgment were intact [de-identified] : Oropharynx erythematous, no exudates.

## 2018-05-15 NOTE — HISTORY OF PRESENT ILLNESS
[FreeTextEntry8] : Presents for evaluation of cough.  8 days ago presented to urgent care for evaluation of sore throat, fatigue, low grade fever and cough.  Reports she tested negative for strep pharyngitis but tested positive for Influenza B.  Subsequently completed a course of Tamiflu but cough escalated after 4 days and she returned to Urgent Care.  Then placed on 10 day course of Amoxicillin.  Cough was keeping her awake, tessalon perles were then prescribed by her cardiologist because of a question regarding rise in blood pressure with use of decongestants that were also prescribed by Urgent Care.  She now "needs help" with cough that is keeping her awake especially when she puts CPAP on at night.  Now has mid back pain from coughing. Tessalon not helping.

## 2018-08-15 ENCOUNTER — APPOINTMENT (OUTPATIENT)
Dept: INTERNAL MEDICINE | Facility: CLINIC | Age: 58
End: 2018-08-15
Payer: COMMERCIAL

## 2018-08-15 VITALS
DIASTOLIC BLOOD PRESSURE: 82 MMHG | WEIGHT: 164 LBS | RESPIRATION RATE: 18 BRPM | HEIGHT: 67 IN | BODY MASS INDEX: 25.74 KG/M2 | SYSTOLIC BLOOD PRESSURE: 112 MMHG | OXYGEN SATURATION: 96 % | HEART RATE: 64 BPM | TEMPERATURE: 98.4 F

## 2018-08-15 DIAGNOSIS — Z78.9 OTHER SPECIFIED HEALTH STATUS: ICD-10-CM

## 2018-08-15 DIAGNOSIS — K58.9 IRRITABLE BOWEL SYNDROME W/OUT DIARRHEA: ICD-10-CM

## 2018-08-15 DIAGNOSIS — E03.9 HYPOTHYROIDISM, UNSPECIFIED: ICD-10-CM

## 2018-08-15 DIAGNOSIS — K21.9 GASTRO-ESOPHAGEAL REFLUX DISEASE W/OUT ESOPHAGITIS: ICD-10-CM

## 2018-08-15 PROCEDURE — 94060 EVALUATION OF WHEEZING: CPT

## 2018-08-15 PROCEDURE — 94727 GAS DIL/WSHOT DETER LNG VOL: CPT

## 2018-08-15 PROCEDURE — 99214 OFFICE O/P EST MOD 30 MIN: CPT | Mod: 25

## 2018-08-15 PROCEDURE — ZZZZZ: CPT

## 2018-08-15 PROCEDURE — 94729 DIFFUSING CAPACITY: CPT

## 2019-02-07 ENCOUNTER — EMERGENCY (EMERGENCY)
Facility: HOSPITAL | Age: 59
LOS: 0 days | Discharge: HOME | End: 2019-02-07
Attending: EMERGENCY MEDICINE
Payer: COMMERCIAL

## 2019-02-07 VITALS
TEMPERATURE: 97 F | RESPIRATION RATE: 17 BRPM | DIASTOLIC BLOOD PRESSURE: 84 MMHG | OXYGEN SATURATION: 98 % | HEART RATE: 68 BPM | SYSTOLIC BLOOD PRESSURE: 136 MMHG

## 2019-02-07 VITALS — WEIGHT: 160.06 LBS | HEIGHT: 66 IN

## 2019-02-07 DIAGNOSIS — I10 ESSENTIAL (PRIMARY) HYPERTENSION: ICD-10-CM
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
## 2019-02-07 DIAGNOSIS — M54.2 CERVICALGIA: ICD-10-CM

## 2019-02-07 DIAGNOSIS — S16.1XXA STRAIN OF MUSCLE, FASCIA AND TENDON AT NECK LEVEL, INITIAL ENCOUNTER: ICD-10-CM

## 2019-02-07 DIAGNOSIS — E03.9 HYPOTHYROIDISM, UNSPECIFIED: ICD-10-CM

## 2019-02-07 DIAGNOSIS — M25.512 PAIN IN LEFT SHOULDER: ICD-10-CM

## 2019-02-07 DIAGNOSIS — Z79.82 LONG TERM (CURRENT) USE OF ASPIRIN: ICD-10-CM
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
## 2019-02-07 PROCEDURE — 99283 EMERGENCY DEPT VISIT LOW MDM: CPT

## 2019-02-07 RX ORDER — NEBIVOLOL HYDROCHLORIDE 5 MG/1
1 TABLET ORAL
Qty: 0 | Refills: 0 | COMMUNITY

## 2019-02-07 RX ORDER — MAGNESIUM OXIDE 400 MG ORAL TABLET 241.3 MG
1 TABLET ORAL
Qty: 0 | Refills: 0 | COMMUNITY

## 2019-02-07 RX ORDER — IBUPROFEN 200 MG
600 TABLET ORAL ONCE
Qty: 0 | Refills: 0 | Status: COMPLETED | OUTPATIENT
Start: 2019-02-07 | End: 2019-02-07

## 2019-02-07 RX ORDER — IBUPROFEN 200 MG
1 TABLET ORAL
Qty: 30 | Refills: 0 | OUTPATIENT
Start: 2019-02-07

## 2019-02-07 RX ORDER — LEVOTHYROXINE SODIUM 125 MCG
1 TABLET ORAL
Qty: 0 | Refills: 0 | COMMUNITY

## 2019-02-07 RX ORDER — METHOCARBAMOL 500 MG/1
2 TABLET, FILM COATED ORAL
Qty: 30 | Refills: 0 | OUTPATIENT
Start: 2019-02-07

## 2019-02-07 RX ORDER — CHOLECALCIFEROL (VITAMIN D3) 125 MCG
1 CAPSULE ORAL
Qty: 0 | Refills: 0 | COMMUNITY

## 2019-02-07 RX ORDER — POLYETHYLENE GLYCOL 3350 17 G/17G
17 POWDER, FOR SOLUTION ORAL
Qty: 0 | Refills: 0 | COMMUNITY

## 2019-02-07 RX ADMIN — Medication 600 MILLIGRAM(S): at 20:33
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
## 2019-02-07 NOTE — ED STATDOCS - ATTENDING CONTRIBUTION TO CARE
I, Andrews Harris DO,  performed the initial face to face bedside interview with this patient regarding history of present illness, review of symptoms and relevant past medical, social and family history.  I completed an independent physical examination.  I was the initial provider who evaluated this patient. I have signed out the follow up of any pending tests (i.e. labs, radiological studies) to the ACP.  I have communicated the patient’s plan of care and disposition with the ACP.

## 2019-02-07 NOTE — ED STATDOCS - NS_ ATTENDINGSCRIBEDETAILS _ED_A_ED_FT
Andrews Harris DO (Attending): The history, relevant review of systems, past medical and surgical history, medical decision making, and physical examination was documented by the scribe in my presence and I attest to the accuracy of the documentation.

## 2019-02-07 NOTE — ED STATDOCS - NSFOLLOWUPINSTRUCTIONS_ED_ALL_ED_FT
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
## 2019-02-07 NOTE — ED STATDOCS - MUSCULOSKELETAL, MLM
range of motion is not limited. No midline TTP to C/T/L spine. Strength 5/5 normal gait. +mild TTP to left trapezius and paraspinal cervical muscles.

## 2019-06-21 ENCOUNTER — CLINICAL ADVICE (OUTPATIENT)
Age: 59
End: 2019-06-21

## 2019-07-19 NOTE — SWALLOW BEDSIDE ASSESSMENT ADULT - SLP PRECAUTIONS/LIMITATIONS: VISION
from [address] for change in mental status. As per staff , pt wasn't responding to staff. pt hasn't been taking her morning meds at the facility
within functional limits

## 2019-08-21 ENCOUNTER — APPOINTMENT (OUTPATIENT)
Dept: INTERNAL MEDICINE | Facility: CLINIC | Age: 59
End: 2019-08-21

## 2020-12-09 ENCOUNTER — APPOINTMENT (OUTPATIENT)
Dept: INTERNAL MEDICINE | Facility: CLINIC | Age: 60
End: 2020-12-09

## 2020-12-09 ENCOUNTER — APPOINTMENT (OUTPATIENT)
Dept: INTERNAL MEDICINE | Facility: CLINIC | Age: 60
End: 2020-12-09
Payer: COMMERCIAL

## 2020-12-09 VITALS
OXYGEN SATURATION: 97 % | SYSTOLIC BLOOD PRESSURE: 130 MMHG | WEIGHT: 149 LBS | BODY MASS INDEX: 23.39 KG/M2 | RESPIRATION RATE: 16 BRPM | HEART RATE: 78 BPM | DIASTOLIC BLOOD PRESSURE: 84 MMHG | HEIGHT: 67 IN | TEMPERATURE: 97 F

## 2020-12-09 PROCEDURE — 99214 OFFICE O/P EST MOD 30 MIN: CPT

## 2020-12-09 PROCEDURE — 99072 ADDL SUPL MATRL&STAF TM PHE: CPT

## 2020-12-09 NOTE — DISCUSSION/SUMMARY
[FreeTextEntry1] : Ms. muir presents for followup evaluation. She is feeling well. Patient denies any chest pain, shortness of breath palpitations. She has no nocturnal symptoms of cough or dyspnea. She will continue on current auto Pap therapy. Patient will schedule a repeat home polysomnography examination to reassess degree of obstructive sleep apnea and CPAP requirements.

## 2020-12-09 NOTE — HISTORY OF PRESENT ILLNESS
[TextBox_4] : Mr. Camacho presents for followup evaluation. She has a history of obstructive sleep apnea. The patient continues on auto Pap therapy. She denies any significant symptoms of daytime tiredness. Patient states that her machine is systolic and to break down and needs a new one.

## 2021-01-29 ENCOUNTER — OUTPATIENT (OUTPATIENT)
Dept: OUTPATIENT SERVICES | Facility: HOSPITAL | Age: 61
LOS: 1 days | End: 2021-01-29
Payer: COMMERCIAL

## 2021-01-29 DIAGNOSIS — G47.33 OBSTRUCTIVE SLEEP APNEA (ADULT) (PEDIATRIC): ICD-10-CM

## 2021-01-29 PROCEDURE — G0399: CPT

## 2021-01-29 PROCEDURE — 95806 SLEEP STUDY UNATT&RESP EFFT: CPT | Mod: 26

## 2021-03-02 ENCOUNTER — NON-APPOINTMENT (OUTPATIENT)
Age: 61
End: 2021-03-02

## 2021-06-02 DIAGNOSIS — Z20.822 ENCOUNTER FOR PREPROCEDURAL LABORATORY EXAMINATION: ICD-10-CM

## 2021-06-02 DIAGNOSIS — Z01.812 ENCOUNTER FOR PREPROCEDURAL LABORATORY EXAMINATION: ICD-10-CM

## 2021-06-09 ENCOUNTER — APPOINTMENT (OUTPATIENT)
Dept: INTERNAL MEDICINE | Facility: CLINIC | Age: 61
End: 2021-06-09
Payer: COMMERCIAL

## 2021-06-09 VITALS
SYSTOLIC BLOOD PRESSURE: 140 MMHG | OXYGEN SATURATION: 97 % | WEIGHT: 152 LBS | DIASTOLIC BLOOD PRESSURE: 80 MMHG | TEMPERATURE: 97.5 F | RESPIRATION RATE: 16 BRPM | HEIGHT: 63.5 IN | BODY MASS INDEX: 26.6 KG/M2 | HEART RATE: 70 BPM

## 2021-06-09 DIAGNOSIS — Z20.828 CONTACT WITH AND (SUSPECTED) EXPOSURE TO OTHER VIRAL COMMUNICABLE DISEASES: ICD-10-CM

## 2021-06-09 DIAGNOSIS — R06.89 DYSPNEA, UNSPECIFIED: ICD-10-CM

## 2021-06-09 DIAGNOSIS — Z87.39 PERSONAL HISTORY OF OTHER DISEASES OF THE MUSCULOSKELETAL SYSTEM AND CONNECTIVE TISSUE: ICD-10-CM

## 2021-06-09 DIAGNOSIS — R06.00 DYSPNEA, UNSPECIFIED: ICD-10-CM

## 2021-06-09 DIAGNOSIS — G47.33 OBSTRUCTIVE SLEEP APNEA (ADULT) (PEDIATRIC): ICD-10-CM

## 2021-06-09 DIAGNOSIS — Z87.19 PERSONAL HISTORY OF OTHER DISEASES OF THE DIGESTIVE SYSTEM: ICD-10-CM

## 2021-06-09 DIAGNOSIS — Z87.09 PERSONAL HISTORY OF OTHER DISEASES OF THE RESPIRATORY SYSTEM: ICD-10-CM

## 2021-06-09 DIAGNOSIS — Z86.11 PERSONAL HISTORY OF TUBERCULOSIS: ICD-10-CM

## 2021-06-09 DIAGNOSIS — I10 ESSENTIAL (PRIMARY) HYPERTENSION: ICD-10-CM

## 2021-06-09 PROCEDURE — ZZZZZ: CPT

## 2021-06-09 PROCEDURE — 99213 OFFICE O/P EST LOW 20 MIN: CPT

## 2021-06-09 RX ORDER — NEBIVOLOL HYDROCHLORIDE 5 MG/1
5 TABLET ORAL DAILY
Qty: 30 | Refills: 0 | Status: DISCONTINUED | COMMUNITY
Start: 2017-07-11 | End: 2021-06-09

## 2021-06-09 RX ORDER — METHYLPREDNISOLONE 4 MG/1
4 TABLET ORAL
Qty: 1 | Refills: 0 | Status: DISCONTINUED | COMMUNITY
Start: 2018-05-15 | End: 2021-06-09

## 2021-06-09 RX ORDER — CARVEDILOL 3.12 MG/1
TABLET, FILM COATED ORAL
Refills: 0 | Status: ACTIVE | COMMUNITY

## 2021-06-09 RX ORDER — CODEINE PHOSPHATE AND GUAIFENESIN 10; 100 MG/5ML; MG/5ML
100-10 LIQUID ORAL
Qty: 240 | Refills: 0 | Status: DISCONTINUED | COMMUNITY
Start: 2018-05-15 | End: 2021-06-09

## 2021-06-09 RX ORDER — ATORVASTATIN CALCIUM 80 MG/1
TABLET, FILM COATED ORAL
Refills: 0 | Status: ACTIVE | COMMUNITY

## 2021-06-09 NOTE — HISTORY OF PRESENT ILLNESS
[TextBox_4] : Mrs. Camacho presents for follow up evaluation. She is feeling well. Patient has a history of obstructive sleep apnea. She is currently on oral Pap 5-20 cm of pressure. She denies significant daytime tiredness. The patient states she is having difficulty with her current machine because it makes a whistling noise at night.

## 2021-10-06 PROBLEM — I10 ESSENTIAL HYPERTENSION: Status: ACTIVE | Noted: 2017-07-11

## 2021-12-08 ENCOUNTER — APPOINTMENT (OUTPATIENT)
Dept: INTERNAL MEDICINE | Facility: CLINIC | Age: 61
End: 2021-12-08

## 2022-01-24 ENCOUNTER — NON-APPOINTMENT (OUTPATIENT)
Age: 62
End: 2022-01-24

## 2022-03-25 RX ORDER — HYOSCYAMINE SULFATE 0.12 MG/1
0.12 TABLET, ORALLY DISINTEGRATING ORAL
Qty: 120 | Refills: 3 | Status: ACTIVE | COMMUNITY
Start: 2022-03-24 | End: 1900-01-01

## 2022-03-25 RX ORDER — DICYCLOMINE HYDROCHLORIDE 10 MG/1
10 CAPSULE ORAL 3 TIMES DAILY
Qty: 90 | Refills: 4 | Status: ACTIVE | COMMUNITY
Start: 2022-03-25 | End: 1900-01-01

## 2022-06-01 ENCOUNTER — APPOINTMENT (OUTPATIENT)
Dept: GASTROENTEROLOGY | Facility: CLINIC | Age: 62
End: 2022-06-01

## 2022-12-23 NOTE — PROGRESS NOTE ADULT - PROBLEM SELECTOR PLAN 2
- continue Bystolic upon discharge Ear Wedge Repair Text: A wedge excision was completed by carrying down an excision through the full thickness of the ear and cartilage with an inward facing Burow's triangle. The wound was then closed in a layered fashion.

## 2023-03-20 NOTE — ED PROVIDER NOTE - NIH STROKE SCALE: 4. FACIAL
"[facility]   Physical Therapy Treatment Note     Name: Bhavna Landry  Clinic Number: [id number]    Therapy Diagnosis:   Encounter Diagnoses   Name Primary?    SI (sacroiliac) joint dysfunction Yes    Spondylosis of lumbar region without myelopathy or radiculopathy     Decreased functional mobility          Physician: lAlyson Galaviz, *    Visit Date: 3/21/2023    Encounter Diagnoses   Name Primary?    SI (sacroiliac) joint dysfunction      Spondylosis of lumbar region without myelopathy or radiculopathy      Decreased functional mobility      Impaired functional mobility, balance, gait, and endurance      Decreased range of motion of lumbar spine     Physician: Allyson Galaviz, *     Physician Orders: PT Eval and Treat   Medical Diagnosis from Referral:   M53.3 (ICD-10-CM) - SI (sacroiliac) joint dysfunction   M47.816 (ICD-10-CM) - Spondylosis of lumbar region without myelopathy or radiculopathy      Evaluation Date: 3/9/2023  Authorization Period Expiration: 4/6/2023  Plan of Care Expiration: 5/4/2023  Progress Note Due: 4/9/2023  Visit # / Visits authorized: 2/ pending   FOTO: 2/5     Precautions: Standard, asthma, smoker, osteoporosis      PTA Visit #: 1/5     Time In: 11:00 AM  Time Out: 11:55 AM   Total Billable Time: 30 minutes    SUBJECTIVE     Pt reports: she uses a heating pad for 15-20 minutes every night.  She was compliant with home exercise program.  Response to previous treatment: No adverse effects  Functional change: Ongoing    Pain: 5/10  Location: bilateral back      OBJECTIVE     Objective Measures updated at progress report unless specified.     Treatment     Jessica received the treatments listed below:      therapeutic exercises to develop strength, endurance, ROM, flexibility, posture, and core stabilization for 30 minutes 1:1 with PTA including: Additional time supervised   Transverse abdominus 15x5" holds   LTR: 20x  Hook lying HIp Add isometric 20x5" " "hold with sm.yellow ball  Scaitic nerve glides: 20x   Piriformis stretch: 3x30"  SKTC: 3x15" bilateral   Hamstring stretch 3x30" with strap  Sit to stand from standard height chair 2x10 reps No upper extremity support       neuromuscular re-education activities to improve: Balance, Coordination, Kinesthetic, Sense, Proprioception, and Posture for 0 minutes. The following activities were included:  NP    therapeutic activities to improve functional performance for 0  minutes, including:  NP            Patient Education and Home Exercises     Home Exercises Provided and Patient Education Provided     Education provided:   - home exercise program review    Written Home Exercises Provided: Patient instructed to cont prior HEP. Exercises were reviewed and Jessica was able to demonstrate them prior to the end of the session.  Jessica demonstrated good  understanding of the education provided. See EMR under Patient Instructions for exercises provided during therapy sessions    ASSESSMENT     Jessica tolerated her first follow up well. She needed supervision to perform the therapeutic exercises effectively. Reports minimal relief following today's interventions.   Jessica Is progressing well towards her goals.   Pt prognosis is Good.     Pt will continue to benefit from skilled outpatient physical therapy to address the deficits listed in the problem list box on initial evaluation, provide pt/family education and to maximize pt's level of independence in the home and community environment.     Pt's spiritual, cultural and educational needs considered and pt agreeable to plan of care and goals.     Anticipated barriers to physical therapy: co-morbidities    Goals:   Short Term Goals (4 Weeks):  1. Patient will be compliant with home exercise program to supplement therapy in promoting functional mobility.  2. Patient will perform transverse abdominus contraction with good control to demonstrate improved core strength.  3. Patient " will report no pain during thoracolumbar active range of motion to promote functional mobility.  4. Patient will improve impaired lower extremity myotomes/manual muscle tests  to >/=4/5 to improve strength for functional tasks.  5. Patient will reports </= 3/10 pain at rest to improve quality of life.     Long Term Goals (8 Weeks):   1. Patient will improve FOTO score to </= 50% limited to decrease perceived limitation with maintaining/changing body position.   2. Patient will improve bilateral hip external/internal rotation range of motion to 30 degrees to promote functional mobility.  3. Patient will improve impaired lower extremity myotomes/manual muscle tests to >/=4+/5 to improve strength for functional tasks.  4. Patient will report no pain while walking 1 mile to return to daily walks.     PLAN     Continue PT plan of care     Rusty Haley PTA       (0) Normal symmetrical movements

## 2023-05-17 NOTE — DISCHARGE NOTE ADULT - INSTRUCTIONS
low fat, low salt diet Detail Level: Zone Initiate Treatment: Ketoconazole fluocinonide Plan: Healing appropriately, keep sutures in place for 2 days.  Return to clinic in 2 days for suture removal

## 2023-08-10 ENCOUNTER — APPOINTMENT (OUTPATIENT)
Dept: GASTROENTEROLOGY | Facility: CLINIC | Age: 63
End: 2023-08-10
Payer: COMMERCIAL

## 2023-08-10 VITALS
BODY MASS INDEX: 28.17 KG/M2 | WEIGHT: 161 LBS | SYSTOLIC BLOOD PRESSURE: 132 MMHG | DIASTOLIC BLOOD PRESSURE: 74 MMHG | HEIGHT: 63.5 IN

## 2023-08-10 DIAGNOSIS — Z12.11 ENCOUNTER FOR SCREENING FOR MALIGNANT NEOPLASM OF COLON: ICD-10-CM

## 2023-08-10 PROCEDURE — 99203 OFFICE O/P NEW LOW 30 MIN: CPT

## 2023-08-10 NOTE — HISTORY OF PRESENT ILLNESS
[FreeTextEntry1] : Ms. HUMAIRA PERKINS is a 62 year old female presents for screening colonoscopy. Patient has no complaints of bowel issues, bleeding, abdominal pain, family history of colon cancer. Patient had last colonoscopy over 5 years ago.  There has been no other significant changes in medical history other than the fact that the patient has been treated more aggressively for sleep apnea.  GERD symptoms are under good control.

## 2023-08-10 NOTE — PHYSICAL EXAM
[Alert] : alert [Normal Voice/Communication] : normal voice/communication [Healthy Appearing] : healthy appearing [No Acute Distress] : no acute distress [Sclera] : the sclera and conjunctiva were normal [Hearing Threshold Finger Rub Not [place]] : hearing was normal [Normal Lips/Gums] : the lips and gums were normal [Oropharynx] : the oropharynx was normal [Normal Appearance] : the appearance of the neck was normal [No Neck Mass] : no neck mass was observed [No Respiratory Distress] : no respiratory distress [No Acc Muscle Use] : no accessory muscle use [Respiration, Rhythm And Depth] : normal respiratory rhythm and effort [Auscultation Breath Sounds / Voice Sounds] : lungs were clear to auscultation bilaterally [Heart Rate And Rhythm] : heart rate was normal and rhythm regular [Normal S1, S2] : normal S1 and S2 [Murmurs] : no murmurs [Bowel Sounds] : normal bowel sounds [Abdomen Tenderness] : non-tender [No Masses] : no abdominal mass palpated [Abdomen Soft] : soft [] : no hepatosplenomegaly [Oriented To Time, Place, And Person] : oriented to person, place, and time

## 2023-12-05 ENCOUNTER — APPOINTMENT (OUTPATIENT)
Dept: GASTROENTEROLOGY | Facility: AMBULATORY MEDICAL SERVICES | Age: 63
End: 2023-12-05
Payer: COMMERCIAL

## 2023-12-05 PROCEDURE — 45378 DIAGNOSTIC COLONOSCOPY: CPT | Mod: GC

## 2024-01-02 ENCOUNTER — EMERGENCY (EMERGENCY)
Facility: HOSPITAL | Age: 64
LOS: 0 days | Discharge: HOME | End: 2024-01-03
Attending: EMERGENCY MEDICINE
Payer: COMMERCIAL

## 2024-01-02 VITALS
OXYGEN SATURATION: 99 % | TEMPERATURE: 98 F | WEIGHT: 164.02 LBS | SYSTOLIC BLOOD PRESSURE: 142 MMHG | RESPIRATION RATE: 18 BRPM | DIASTOLIC BLOOD PRESSURE: 94 MMHG | HEIGHT: 64 IN | HEART RATE: 72 BPM

## 2024-01-02 DIAGNOSIS — I10 ESSENTIAL (PRIMARY) HYPERTENSION: ICD-10-CM

## 2024-01-02 DIAGNOSIS — S00.83XA CONTUSION OF OTHER PART OF HEAD, INITIAL ENCOUNTER: ICD-10-CM

## 2024-01-02 DIAGNOSIS — E03.9 HYPOTHYROIDISM, UNSPECIFIED: ICD-10-CM

## 2024-01-02 DIAGNOSIS — Z87.81 PERSONAL HISTORY OF (HEALED) TRAUMATIC FRACTURE: ICD-10-CM

## 2024-01-02 DIAGNOSIS — R51.9 HEADACHE, UNSPECIFIED: ICD-10-CM
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
## 2024-01-02 DIAGNOSIS — S20.211A CONTUSION OF RIGHT FRONT WALL OF THORAX, INITIAL ENCOUNTER: ICD-10-CM

## 2024-01-02 PROCEDURE — 70450 CT HEAD/BRAIN W/O DYE: CPT | Mod: MA

## 2024-01-02 PROCEDURE — 71046 X-RAY EXAM CHEST 2 VIEWS: CPT

## 2024-01-02 PROCEDURE — 99284 EMERGENCY DEPT VISIT MOD MDM: CPT | Mod: 25

## 2024-01-02 PROCEDURE — 99283 EMERGENCY DEPT VISIT LOW MDM: CPT

## 2024-01-02 PROCEDURE — 70486 CT MAXILLOFACIAL W/O DYE: CPT | Mod: MA

## 2024-01-02 PROCEDURE — 76376 3D RENDER W/INTRP POSTPROCES: CPT
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
## 2024-01-02 NOTE — ED PROVIDER NOTE - CARDIAC, MLM
Normal rate, regular rhythm.  Heart sounds S1, S2.  No murmurs, rubs or gallops.   tenderness to palpation of right ribs 8 through 10 mid axillary line without crepitus or ecchymosis

## 2024-01-02 NOTE — ED ADULT TRIAGE NOTE - CHIEF COMPLAINT QUOTE
pt c/o right sided facial pain, rib pain s/p fall. denies loc, -anticoagulants. pt a&oX4 speaking in full and complete sentences.  denies cp/sob/n/v/d/fever/chills. pmh:htn
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
## 2024-01-02 NOTE — ED PROVIDER NOTE - CARE PLAN
1 Principal Discharge DX:	Contusion of ribs, right, initial encounter  Secondary Diagnosis:	Contusion of face

## 2024-01-02 NOTE — ED PROVIDER NOTE - PATIENT PORTAL LINK FT
You can access the FollowMyHealth Patient Portal offered by [facility] by registering at the following website: [email or web address]. By joining [facility]’s FollowMyHealth portal, you will also be able to view your health information using other applications (apps) compatible with our system. You can access the FollowMyHealth Patient Portal offered by [facility] by registering at the following website: [email or web address]. By joining [facility]’s FollowMyHealth portal, you will also be able to view your health information using other applications (apps) compatible with our system.

## 2024-01-02 NOTE — ED PROVIDER NOTE - HEAD, MLM
Head is atraumatic. Head shape is symmetrical.    Tenderness to palpation of right cheek   extraocular motion intact

## 2024-01-03 VITALS
OXYGEN SATURATION: 98 % | TEMPERATURE: 98 F | HEART RATE: 60 BPM | DIASTOLIC BLOOD PRESSURE: 87 MMHG | SYSTOLIC BLOOD PRESSURE: 155 MMHG | RESPIRATION RATE: 18 BRPM

## 2024-01-03 PROCEDURE — 70486 CT MAXILLOFACIAL W/O DYE: CPT | Mod: 26,MA

## 2024-01-03 PROCEDURE — 71046 X-RAY EXAM CHEST 2 VIEWS: CPT | Mod: 26

## 2024-01-03 PROCEDURE — 70450 CT HEAD/BRAIN W/O DYE: CPT | Mod: 26,MA

## 2024-01-03 PROCEDURE — 76376 3D RENDER W/INTRP POSTPROCES: CPT | Mod: 26

## 2024-01-03 RX ORDER — LIDOCAINE 4 G/100G
1 CREAM TOPICAL ONCE
Refills: 0 | Status: COMPLETED | OUTPATIENT
Start: 2024-01-02 | End: 2024-01-02

## 2024-01-03 RX ORDER — ACETAMINOPHEN 500 MG
650 TABLET ORAL ONCE
Refills: 0 | Status: COMPLETED | OUTPATIENT
Start: 2024-01-02 | End: 2024-01-02

## 2024-01-03 RX ADMIN — LIDOCAINE 1 PATCH: 4 CREAM TOPICAL at 00:47

## 2024-01-03 RX ADMIN — Medication 650 MILLIGRAM(S): at 00:42

## 2024-01-03 NOTE — ED ADULT NURSE NOTE - NSFALLRISKINTERV_ED_ALL_ED
Communicate fall risk and risk factors to all staff, patient, and family/Provide visual cue: yellow wristband, yellow gown, etc/Reinforce activity limits and safety measures with patient and family/Call bell, personal items and telephone in reach/Instruct patient to call for assistance before getting out of bed/chair/stretcher/Non-slip footwear applied when patient is off stretcher/[place] to call system/Physically safe environment - no spills, clutter or unnecessary equipment/Purposeful Proactive Rounding/Room/bathroom lighting operational, light cord in reach Communicate fall risk and risk factors to all staff, patient, and family/Provide visual cue: yellow wristband, yellow gown, etc/Reinforce activity limits and safety measures with patient and family/Call bell, personal items and telephone in reach/Instruct patient to call for assistance before getting out of bed/chair/stretcher/Non-slip footwear applied when patient is off stretcher/[place] to call system/Physically safe environment - no spills, clutter or unnecessary equipment/Purposeful Proactive Rounding/Room/bathroom lighting operational, light cord in reach

## 2024-01-03 NOTE — ED ADULT NURSE NOTE - MODE OF DISCHARGE

## 2024-01-03 NOTE — ED ADULT NURSE NOTE - NSFALLLASTSIX_ED_ALL_ED
Uncontrolled thyroid, increased dose of Levothyroxine to 75 mcg/day.  Repeat blood work in 3 months
Yes.

## 2024-06-24 NOTE — ED PROVIDER NOTE - NSICDXNOFAMILYHX_GEN_ALL_ED
Quality 226: Preventive Care And Screening: Tobacco Use: Screening And Cessation Intervention: Patient screened for tobacco use and is an ex/non-smoker
Detail Level: Detailed
<-- Click to add NO pertinent Family History

## 2024-11-26 NOTE — ED PROVIDER NOTE - NEUROLOGICAL, MLM
Form rec'd. Given to MD for review and sign off   Alert and oriented, no focal deficits, no motor or sensory deficits.

## 2025-03-19 NOTE — ED ADULT NURSE NOTE - CAS DISCH TRANSFER METHOD
Neurosurgery Post-Op Progress Note    DATE:  3/19/2025  POSTOPERATIVE DAY:  1 Day Post-Op       SUBJECTIVE  Interval History  No acute events overnight.   Expected post-operative pain, mostly controlled with current pain regimen. Pt reports L sided low back pain that is helped by pain meds.  Voiding. Tolerating PO. No swallowing difficulties.  No nausea, vomiting, or abdominal pain. No postoperative bowel movement yet.  Ambulating to chair     OBJECTIVE  Exam  General: Patient is awake and alert, sitting upright on exam bed. Non-toxic in appearance. No acute distress.  MSK: Motor / sens to light touch grossly intact.   Drain: Documented output noted. At bedside, minimal bloody output in surgical drain.  Incision: Clean, dry, and intact with no signs of infection.  Staples in place.     Wound Back Medial Open Surgical Wound (Active)   Dressing Assessment Clean;Dry;Intact 03/19/25 0835   Dressing Activity Applied 03/18/25 1216   Wound Exudate None 03/19/25 0835   Wound Edge Stapled 03/19/25 0835   Wound Dressing Non-adherent island dressing 03/19/25 0835       Vitals  Vital Last Value   /54 (03/19/25 0424)   Pulse 82 (03/19/25 0424)   Respiratory 16 (03/19/25 0935)   SpO2 94 %   Temperature 98.8 °F (37.1 °C) (03/19/25 0424)   Pulse Oximetry 94 % (03/19/25 0424)   Weight 81.6 kg (179 lb 14.3 oz) (03/18/25 0624)   BMI Body mass index is 34.86 kg/m².   Last BM       ASSESSMENT  1 Day Post-Op s/p Procedure(s):  Open Lumbar 2-Lumbar 3, Lumbar 3-Lumbar 4, Lumbar 4-Lumbar 5 Transforaminal Lumbar Interbody Fusion (3/18/2025)    PLAN  Pain Regimen: continue PRN: oxy w/ benadryl, dilaudid, tizanidine   Drain: Remains in place. Will continue to assess for appropriate removal. Please continue to empty Q shift and PRN, documenting output.   Date 03/18/25 0700 - 03/19/25 0659 03/19/25 0700 - 03/20/25 0659   Shift 6375-1507 9928-8195 4795-9446 24 Hour Total 0526-3977 3236-4584 8560-6956 24 Hour Total   INTAKE   I.V. 311 027        Shift Total 650   650       OUTPUT   Urine   100 100       Drains 60 180 70 310         Output (ml) (Drain 03/18/25 Left Back Accordion (e.g. Hemovac, etc)) 60 180 70 310       Shift Total 60 180 170 410       Weight (kg) 81.6 81.6 81.6 81.6 81.6 81.6 81.6 81.6      Activity: No brace required. Advance activity as tolerated.   Incision: Anticipate staple removal 2-3 weeks postop.  Change dressing daily and PRN. Avoid rubbing/irritation to incision site.   Consults:  PT: current recommendation - No value filed.  OT: current recommendation - No value filed.  Internal Medicine: appreciate their continued medical management & recommendations  Prophylaxis:   Continue bowel regimen.   Encourage IS use.  DVT: SCDs, ok for chemoppx tonight, POD1    Dispo: pending PT/OT and drain OP    Deidra Melendez PA-C  Neurosurgery  Discussed with Attending, Michael Hawkins MD     Private car